# Patient Record
Sex: MALE | Race: WHITE | NOT HISPANIC OR LATINO | Employment: UNEMPLOYED | URBAN - METROPOLITAN AREA
[De-identification: names, ages, dates, MRNs, and addresses within clinical notes are randomized per-mention and may not be internally consistent; named-entity substitution may affect disease eponyms.]

---

## 2024-01-16 ENCOUNTER — APPOINTMENT (OUTPATIENT)
Dept: GENERAL RADIOLOGY | Facility: HOSPITAL | Age: 34
End: 2024-01-16
Payer: MEDICAID

## 2024-01-16 ENCOUNTER — HOSPITAL ENCOUNTER (EMERGENCY)
Facility: HOSPITAL | Age: 34
Discharge: HOME | End: 2024-01-16
Attending: EMERGENCY MEDICINE | Admitting: EMERGENCY MEDICINE
Payer: MEDICAID

## 2024-01-16 ENCOUNTER — APPOINTMENT (OUTPATIENT)
Dept: CT IMAGING | Facility: HOSPITAL | Age: 34
End: 2024-01-16
Payer: MEDICAID

## 2024-01-16 VITALS
HEIGHT: 71 IN | OXYGEN SATURATION: 97 % | DIASTOLIC BLOOD PRESSURE: 87 MMHG | BODY MASS INDEX: 25.2 KG/M2 | SYSTOLIC BLOOD PRESSURE: 120 MMHG | WEIGHT: 180 LBS | HEART RATE: 95 BPM | RESPIRATION RATE: 17 BRPM | TEMPERATURE: 99.1 F

## 2024-01-16 DIAGNOSIS — M25.562 ACUTE PAIN OF LEFT KNEE: Primary | ICD-10-CM

## 2024-01-16 DIAGNOSIS — S82.102A CLOSED FRACTURE OF PROXIMAL END OF LEFT TIBIA, UNSPECIFIED FRACTURE MORPHOLOGY, INITIAL ENCOUNTER: ICD-10-CM

## 2024-01-16 PROCEDURE — 96372 THER/PROPH/DIAG INJ SC/IM: CPT

## 2024-01-16 PROCEDURE — 99284 EMERGENCY DEPT VISIT MOD MDM: CPT

## 2024-01-16 PROCEDURE — 73700 CT LOWER EXTREMITY W/O DYE: CPT

## 2024-01-16 PROCEDURE — 25010000002 KETOROLAC TROMETHAMINE PER 15 MG

## 2024-01-16 PROCEDURE — 73562 X-RAY EXAM OF KNEE 3: CPT

## 2024-01-16 RX ORDER — KETOROLAC TROMETHAMINE 30 MG/ML
30 INJECTION, SOLUTION INTRAMUSCULAR; INTRAVENOUS ONCE
Status: COMPLETED | OUTPATIENT
Start: 2024-01-16 | End: 2024-01-16

## 2024-01-16 RX ADMIN — KETOROLAC TROMETHAMINE 30 MG: 30 INJECTION, SOLUTION INTRAMUSCULAR; INTRAVENOUS at 20:49

## 2024-01-17 NOTE — ED NOTES
"Pt reports slipping on snow in truck bed while helping someone move. Left knee pain , states his leg is \" feeling like its going outward when walking\" knee swollen . Pt  took 800 mg IBU at 1330 .  Nurse applied ice in room Vss    "

## 2024-01-17 NOTE — ED NOTES
Pt educated on crutches and immobilizer. Pt instructed to call dr Munoz in the morning. Pt given cd to bring to dr munoz. Pt very pleasant young man very motivating and going in right direction

## 2024-01-17 NOTE — ED NOTES
Pt returned from xray , waiting on results . Sitting on bed , ice to area. VSS pt reports pain is decreasing

## 2024-01-17 NOTE — FSED PROVIDER NOTE
Subjective   History of Present Illness  33-year-old male reports standing on the back of a pickup truck bed helping a friend move furniture when he slipped reports that he fell backwards and landed awkwardly on his left leg.  He feels like he may have twisted his left knee.  He is reporting pain to the sides of his left knee.  He denies injury to any other part of his body.  He denies hitting his head or loss of consciousness.  He reports that he is from [place] and he is living in this area for drug rehab.  He reports that he has a large amount of ibuprofen that he can take for pain management        Review of Systems   All other systems reviewed and are negative.      Past Medical History:   Diagnosis Date    Hepatitis C        No Known Allergies    History reviewed. No pertinent surgical history.    History reviewed. No pertinent family history.    Social History     Socioeconomic History    Marital status: Single           Objective   Physical Exam  Constitutional:       Appearance: Normal appearance.   HENT:      Head: Normocephalic and atraumatic.      Mouth/Throat:      Mouth: Mucous membranes are moist.      Pharynx: Oropharynx is clear.   Eyes:      Extraocular Movements: Extraocular movements intact.      Conjunctiva/sclera: Conjunctivae normal.      Pupils: Pupils are equal, round, and reactive to light.   Cardiovascular:      Rate and Rhythm: Normal rate.      Pulses: Normal pulses.      Heart sounds: Normal heart sounds.   Pulmonary:      Effort: Pulmonary effort is normal.      Breath sounds: Normal breath sounds.   Abdominal:      General: Abdomen is flat.      Palpations: Abdomen is soft.   Musculoskeletal:         General: Swelling (Swelling to the medial and lateral aspects of the patella.) present.   Skin:     General: Skin is warm and dry.   Neurological:      General: No focal deficit present.      Mental Status: He is alert and oriented to person, place, and time.         Procedures            ED Course  ED Course as of 01/16/24 2231   Tue Jan 16, 2024   2141 If knee x-ray:  Impression:  Deformity and sclerosis of the lateral tibial plateau which is depressed approximately 5 mm. There is also a large lipohemarthrosis concerning for acute fracture although a definitive acute fracture line itself is not seen. Orthopedic consultation is   recommended. Cross-sectional imaging with CT and/or MRI is recommended as well.   [WF]      ED Course User Index  [WF] Will Nicolas Jr., SYLVIA                                           Medical Decision Making  Will get x-ray of the left knee.  Will give an injection of Toradol here today.    X-ray of the left knee shows a possible acute fracture.  I have talked to Dr. Stevens orthopedist on-call.  He advising CT scan of the left extremity, place patient in knee immobilizer provide him crutches and then have patient follow-up tomorrow in clinic.  I have updated the patient on these findings and he is agreeable to plan of care.    Patient is a recovering addict who lives at a local [facility].  He indicates to me that he is agreeable to take Tylenol and ibuprofen for pain management.    Patient has been given a knee immobilizer and crutches we have called [facility] for a ride.  He has also been provided the number for local orthopedic clinic.  Will be discharged at this time.    Problems Addressed:  Acute pain of left knee: complicated acute illness or injury  Closed fracture of proximal end of left tibia, unspecified fracture morphology, initial encounter: complicated acute illness or injury    Amount and/or Complexity of Data Reviewed  Radiology: ordered.    Risk  Prescription drug management.        Final diagnoses:   Acute pain of left knee   Closed fracture of proximal end of left tibia, unspecified fracture morphology, initial encounter       ED Disposition  ED Disposition       ED Disposition   Discharge    Condition   Stable    Comment   --                [facility]  [address]  [facility] [id number] [phone]             Medication List      No changes were made to your prescriptions during this visit.

## 2024-01-17 NOTE — DISCHARGE INSTRUCTIONS
Recommend that you wear your knee immobilizer at all time    Recommend nonweightbearing activities and the use of crutches at all time    Recommend that you call [facility] at [phone].    As we discussed recommend that you take every 4-6 hours Tylenol or ibuprofen.